# Patient Record
Sex: FEMALE | Race: WHITE | NOT HISPANIC OR LATINO
[De-identification: names, ages, dates, MRNs, and addresses within clinical notes are randomized per-mention and may not be internally consistent; named-entity substitution may affect disease eponyms.]

---

## 2021-04-20 PROBLEM — Z00.00 ENCOUNTER FOR PREVENTIVE HEALTH EXAMINATION: Status: ACTIVE | Noted: 2021-04-20

## 2021-05-17 ENCOUNTER — APPOINTMENT (OUTPATIENT)
Dept: ORTHOPEDIC SURGERY | Facility: CLINIC | Age: 51
End: 2021-05-17
Payer: COMMERCIAL

## 2021-05-17 VITALS — BODY MASS INDEX: 32.18 KG/M2 | HEIGHT: 67 IN | WEIGHT: 205 LBS

## 2021-05-17 DIAGNOSIS — M75.81 OTHER SHOULDER LESIONS, RIGHT SHOULDER: ICD-10-CM

## 2021-05-17 DIAGNOSIS — M25.511 PAIN IN RIGHT SHOULDER: ICD-10-CM

## 2021-05-17 PROCEDURE — 76882 US LMTD JT/FCL EVL NVASC XTR: CPT | Mod: RT,59

## 2021-05-17 PROCEDURE — 99072 ADDL SUPL MATRL&STAF TM PHE: CPT

## 2021-05-17 PROCEDURE — 99204 OFFICE O/P NEW MOD 45 MIN: CPT | Mod: 25

## 2021-05-17 PROCEDURE — 20611 DRAIN/INJ JOINT/BURSA W/US: CPT | Mod: RT

## 2021-05-17 NOTE — DISCUSSION/SUMMARY
[de-identified] : ULTRASOUND EVALUATION  REVEALS INFLAMMATORY CHANGES WITHOUT SIGNIFICANT TEAR \par PATIENT HAS ELECTED TO PROCEED WITH KENALOG INJECTION SHOULDER \par RISKS AND BENEFITS DISCUSSED - VERBAL CONSENT OBTAINED \par \par \par POST INJECTION INSTRUCTIONS\par \par COLD THERAPY , ANALGESICS TYLENOL  PRN\par \par HOME STRETCHING AND EXERCISES QD  HANDOUT PROVIDED, REVIEWED AND DEMONSTRATED  - TBAND 2 \par \par START P.T.  2 WEEKS AFTER INJECTION - 2 X 4 WEEKS - WAIT FOR MRI RESULTS\par \par MRI RIGHT SHOULDER RULE OUT RC TEAR \par

## 2021-05-17 NOTE — PHYSICAL EXAM
[de-identified] : PHYSICAL EXAM  RIGHT  SHOULDER\par \par MILD  SCAPULAR PROTRACTION\par AROM 140 /  140 / 90 / 30 \par TENDER: SA REGION\par \par SPECIAL TESTING :\par KUMAR - POSITIVE \par ALLISON - POSITIVE \par SPEED TEST - POSITIVE\par \par MINOR - NEGATIVE \par APPREHENSION AND SUPPRESSION - NEGATIVE \par \par RC STRENGTH TESTING \par SS:  5/5\par SUB 5/5\par IS     5/5\par BICEPS  5/5\par \par SENSATION  - GROSSLY INTACT\par \par \par  [de-identified] : RIGHT SHOULDER XRAY (2 VIEWS - AP AND OUTLET) -  \par NO OBVIOUS FRACTURE ,  SEPARATION OR DISLOCATION \par NO SIGNIFICANT OSTEOARTHRITIS,\par TYPE 1B ACROMION \par CSA= 38.9\par

## 2021-05-17 NOTE — HISTORY OF PRESENT ILLNESS
[de-identified] : CHRONIC RIGHT SHOULDER PAIN \par PAIN BEGAN AUG 2020 \par PAIN LEVEL: 2/10\par LIMITED ROM \par WORSE WITH EXERCISING, MORNING \par POPPING SOUND \par BETTER WITH ARTHRITIS , IBUPROFEN, HEATING PAD \par \par PT SAW DR. ARIZA IN THE PAST- HAS HAD CORTISONE INJECTION-HELPFUL \par

## 2021-05-17 NOTE — PROCEDURE
[de-identified] : DIAGNOSTIC ULTRASOUND RIGHT SHOULDER\par \par DIAGNOSTIC SONOGRAPHY of the Rotator Cuff Soft Tissue of the RIGHT SHOULDER was performed in Multiple Scan Planes with varying transducer frequencies.\par Imaging of the Supraspinatus Tendon reveals TENDONITIS, BURSITIS, ARTICULAR SIDE DEGENERATION  WITH OUT SIGNIFICANT / COMPLETE TEAR\par Imaging of the Biceps Tendon reveals no significant tear.\par Imaging of the Subscapularis Tendon reveals no significant tear.\par Imaging of the Infraspinatus Tendon reveals no significant tear.\par Key images were save digitally and reviewed with patient.\par \par \par \par INJECTION RIGHT SHOULDER SA SPACE\par \par Patient has demonstrated limited relief from NSAIDS, rest, exercises / PT, and after discussion of the risks and benefits, the patient has elected to proceed with an ULTRASOUND GUIDED injection into the RIGHT SUBACROMIAL  SPACE LATERAL APPROACH \par  \par Confirmed that the patient does not have history of prior adverse reactions, active, infections, or relevant allergies. There was no effusion, erythema, or warmth, and the skin was clear\par \par The skin was sterilized with alcohol. Ethyl Chloride was used as a topical anesthetic. Routine sterile technique. \par The site was injected UTILIZING ULTRASOUND GUIDANCE to confirm appropriate placement of the needle-\par with a mixture of medication and local anesthetic. The injection was completed without complication and a bandage was applied.\par  \par The patient tolerated the procedure well and was given post-injection instructions.Rec: Cold therapy, analgesics, avoid heavy activity.\par MEDICATION: 4cc of 1% xylocaine + 10mg of KENALOG\par \par

## 2021-05-26 ENCOUNTER — APPOINTMENT (OUTPATIENT)
Dept: ORTHOPEDIC SURGERY | Facility: CLINIC | Age: 51
End: 2021-05-26

## 2021-05-27 ENCOUNTER — APPOINTMENT (OUTPATIENT)
Dept: ORTHOPEDIC SURGERY | Facility: CLINIC | Age: 51
End: 2021-05-27
Payer: COMMERCIAL

## 2021-05-27 PROCEDURE — 99213 OFFICE O/P EST LOW 20 MIN: CPT | Mod: 95

## 2021-05-27 NOTE — PHYSICAL EXAM
[de-identified] : PHYSICAL EXAM  RIGHT  SHOULDER - PRIOR \par \par MILD  SCAPULAR PROTRACTION\par AROM 140 /  140 / 90 / 30 \par TENDER: SA REGION\par \par SPECIAL TESTING :\par KUMAR - POSITIVE \par ALLISON - POSITIVE \par SPEED TEST - POSITIVE\par \par MINOR - NEGATIVE \par APPREHENSION AND SUPPRESSION - NEGATIVE \par \par RC STRENGTH TESTING \par SS:  5/5\par SUB 5/5\par IS     5/5\par BICEPS  5/5\par \par SENSATION  - GROSSLY INTACT\par \par \par

## 2021-05-27 NOTE — HISTORY OF PRESENT ILLNESS
[de-identified] : CHRONIC RIGHT SHOULDER PAIN \par FOLLOW UP\par PAIN LEVEL: 1/10\par MRI RESULTS TODAY \par \par PAIN BEGAN AUG 2020 \par LIMITED ROM \par WORSE WITH EXERCISING, MORNING \par POPPING SOUND \par BETTER WITH ARTHRITIS , IBUPROFEN, HEATING PAD \par \par PT SAW DR. ARIZA IN THE PAST- HAS HAD CORTISONE INJECTION-HELPFUL

## 2021-09-02 ENCOUNTER — APPOINTMENT (OUTPATIENT)
Dept: ORTHOPEDIC SURGERY | Facility: CLINIC | Age: 51
End: 2021-09-02
Payer: COMMERCIAL

## 2021-09-02 PROCEDURE — 99213 OFFICE O/P EST LOW 20 MIN: CPT | Mod: 95

## 2021-09-02 RX ORDER — OXYCODONE AND ACETAMINOPHEN 7.5; 325 MG/1; MG/1
7.5-325 TABLET ORAL
Qty: 42 | Refills: 0 | Status: ACTIVE | COMMUNITY
Start: 2021-09-02 | End: 1900-01-01

## 2021-09-02 RX ORDER — ONDANSETRON 8 MG/1
8 TABLET, ORALLY DISINTEGRATING ORAL 3 TIMES DAILY
Qty: 15 | Refills: 0 | Status: ACTIVE | COMMUNITY
Start: 2021-09-02 | End: 1900-01-01

## 2021-09-02 NOTE — HISTORY OF PRESENT ILLNESS
[Home] : at home, [unfilled] , at the time of the visit. [Medical Office: ([facility])___] : at the medical office located in  [Verbal consent obtained from patient] : the patient, [unfilled] [de-identified] : CHRONIC RIGHT SHOULDER PAIN \par FOLLOW UP\par PAIN LEVEL: 1/10\par \par PAIN BEGAN AUG 2020 \par LIMITED ROM \par WORSE WITH EXERCISING, MORNING \par POPPING SOUND \par BETTER WITH ARTHRITIS , IBUPROFEN, HEATING PAD \par MRI = 1CM RC TEAR

## 2021-09-02 NOTE — DISCUSSION/SUMMARY
[de-identified] : PREOP SHOULDER SURGERY DISCUSSION:\par \par PROCEDURE DISCUSSED - QUESTIONS ANSWERED\par PATIENT WISHES TO PROCEED\par \par POST OP CARE AND LIMITATIONS REVIEWED - HANDOUT PROVIDED \par \par COLD PACKS RECOMMENDED\par ANALGESICS PRESCRIBED \par \par \par THERE ARE NO GUARANTEES THAT ALL SYMPTOMS WILL BE ALLEVIATED  \par SHOULDER ARTHROSCOPY, ACROMIOPLASTY, DEBRIDEMENT,  RC REPAIR AND LABRUM REPAIRS- ON AVERAGE 75- 85% SATISFACTORY RESULTS FOR TEARS < 3CM AFTER 9-12 MONTHS HEALING AND REHABILITATION. \par \par REPAIRS WILL REQUIRE STRICT SHOULDER IMMOBILIZER 4-6 WEEKS\par \par RC TEARS 3CM OR LARGER MAY REQUIRE COLLAGEN PATCH AUGMENTATION GENERALLY HAVE LESS SATISFACTORY RESULTS\par \par PHYSICAL THERAPY REQUIRED 2X WEEK FOR  MINIMUM 8-12 WEEKS FOR ALL PROCEDURES \par CONTINUED HOME EXERCISES 6-9 MONTHS AFTER THAT REQUIRED FOR OPTIMAL OUTCOMES \par \par ROUTINE SURGICAL AND ANESTHETIC RISKS INCLUDE RISK OF SURGICAL INFECTION, ANESTHETIC COMPLICATION OR ALLERGY, POSSIBLE RETEARS OR PROGRESSION OF TEAR, STIFFNESS OF SHOULDER AND UNSATISFACTORY OUTCOMES\par \par PATIENT UNDERSTANDS AND WISHES TO PROCEED\par

## 2021-09-02 NOTE — PHYSICAL EXAM
[de-identified] : PHYSICAL EXAM  RIGHT  SHOULDER - PRIOR \par \par MILD  SCAPULAR PROTRACTION\par AROM 140 /  140 / 90 / 30 \par TENDER: SA REGION\par \par SPECIAL TESTING :\par KUMAR - POSITIVE \par ALLISON - POSITIVE \par SPEED TEST - POSITIVE\par \par MINOR - NEGATIVE \par APPREHENSION AND SUPPRESSION - NEGATIVE \par \par RC STRENGTH TESTING \par SS:  5/5\par SUB 5/5\par IS     5/5\par BICEPS  5/5\par \par SENSATION  - GROSSLY INTACT\par \par \par

## 2021-09-04 ENCOUNTER — LABORATORY RESULT (OUTPATIENT)
Age: 51
End: 2021-09-04

## 2021-09-07 ENCOUNTER — APPOINTMENT (OUTPATIENT)
Dept: ORTHOPEDIC SURGERY | Facility: AMBULATORY SURGERY CENTER | Age: 51
End: 2021-09-07
Payer: COMMERCIAL

## 2021-09-07 PROCEDURE — 29827 SHO ARTHRS SRG RT8TR CUF RPR: CPT | Mod: RT

## 2021-09-07 PROCEDURE — 29820 SHO ARTHRS SRG PRTL SYNVCT: CPT | Mod: RT,59

## 2021-09-07 PROCEDURE — 29826 SHO ARTHRS SRG DECOMPRESSION: CPT | Mod: RT,59

## 2021-09-07 PROCEDURE — 29823 SHO ARTHRS SRG XTNSV DBRDMT: CPT | Mod: RT,59

## 2021-09-10 ENCOUNTER — APPOINTMENT (OUTPATIENT)
Dept: ORTHOPEDIC SURGERY | Facility: CLINIC | Age: 51
End: 2021-09-10
Payer: COMMERCIAL

## 2021-09-10 PROCEDURE — 73030 X-RAY EXAM OF SHOULDER: CPT | Mod: RT

## 2021-09-10 PROCEDURE — 99024 POSTOP FOLLOW-UP VISIT: CPT

## 2021-09-10 RX ORDER — CELECOXIB 200 MG/1
200 CAPSULE ORAL TWICE DAILY
Qty: 60 | Refills: 4 | Status: ACTIVE | COMMUNITY
Start: 2021-09-10 | End: 1900-01-01

## 2021-09-10 NOTE — HISTORY OF PRESENT ILLNESS
[de-identified] : CHRONIC RIGHT SHOULDER PAIN \par FOLLOW UP\par PAIN LEVEL: 5/10\par SEPTEMBER 7, 2021 - RIGHT 1CM RC REPAIR , EPIFANIO \par

## 2021-09-10 NOTE — PHYSICAL EXAM
[de-identified] : RIGHT SHOULDER XRAY (2 VIEWS - AP AND OUTLET)  -  \par NO OBVIOUS FRACTURE OR DISLOCATION, \par SATISFACTORY DECOMPRESSION NOTED  , \par ANCHOR SILHOUETTE VISIBLE IN GREATER TUBEROSITY- SATISFACTORY POSITION\par

## 2021-09-16 RX ORDER — IBUPROFEN 800 MG/1
800 TABLET ORAL 3 TIMES DAILY
Qty: 90 | Refills: 5 | Status: ACTIVE | COMMUNITY
Start: 2021-09-16 | End: 1900-01-01

## 2021-09-16 RX ORDER — ZOLPIDEM TARTRATE 10 MG/1
10 TABLET ORAL
Qty: 30 | Refills: 0 | Status: ACTIVE | COMMUNITY
Start: 2021-09-16 | End: 1900-01-01

## 2021-09-29 ENCOUNTER — APPOINTMENT (OUTPATIENT)
Dept: ORTHOPEDIC SURGERY | Facility: CLINIC | Age: 51
End: 2021-09-29
Payer: COMMERCIAL

## 2021-09-29 DIAGNOSIS — M75.41 IMPINGEMENT SYNDROME OF RIGHT SHOULDER: ICD-10-CM

## 2021-09-29 PROCEDURE — 99024 POSTOP FOLLOW-UP VISIT: CPT

## 2021-09-29 NOTE — DISCUSSION/SUMMARY
[de-identified] : SEPT 7, 2021 - RIGHT 1CM RC REPAIR , EPIFANIO \par \par POST OP REPAIR:\par \par COLD THERAPY,ANALGESICS AS NEEDED\par \par SHOULDER IMMOBILIZED FULL TIME X 3 WEEKS - STRICT NO AROM - DESKTOP WORK ALLOWED\par \par SCAPULAR SQUEEZES / ROLLS, ELBOW, WRIST, HAND AROM TID \par \par START PENDULUM EXERCISES, EXT ROT \par \par START AAROM FLEXION, PULLEY\par \par 6 WEEKS POSTOP  - ADVANCE TO P.T.\par

## 2021-09-29 NOTE — PHYSICAL EXAM
[de-identified] : PHYSICAL EXAM SHOULDER POST OP 2\par SEPTEMBER 7, 2021 - RIGHT 1CM RC REPAIR , EPIFANIO \par PORTALS / INCISIONS HEALING - \par NO ERYTHEMA OR CALOR \par \par AROIM 90 / 90 \par \par RC 4/5\par

## 2021-09-29 NOTE — HISTORY OF PRESENT ILLNESS
[de-identified] : CHRONIC RIGHT SHOULDER PAIN \par FOLLOW UP\par PAIN LEVEL: 2-3/10\par SEPTEMBER 7, 2021 - RIGHT 1CM RC REPAIR , EPIFANIO \par PENDULUM EXERCISES-HELPFUL \par

## 2021-11-01 ENCOUNTER — APPOINTMENT (OUTPATIENT)
Dept: ORTHOPEDIC SURGERY | Facility: CLINIC | Age: 51
End: 2021-11-01
Payer: COMMERCIAL

## 2021-11-01 PROCEDURE — 99024 POSTOP FOLLOW-UP VISIT: CPT

## 2021-11-01 NOTE — PHYSICAL EXAM
[de-identified] : PHYSICAL EXAM SHOULDER POST OP 2\par SEPTEMBER 7, 2021 - RIGHT 1CM RC REPAIR , EPIFANIO \par PORTALS / INCISIONS HEALING - \par NO ERYTHEMA OR CALOR \par \par AAROM 140 / 140 / 80 / 0 \par \par RC 4/5\par

## 2021-11-01 NOTE — DISCUSSION/SUMMARY
[de-identified] : SEPT 7, 2021 - RIGHT 1CM RC REPAIR , EPIFANIO \par \par POST OP REPAIR:\par \par COLD THERAPY,ANALGESICS AS NEEDED\par \par CONTINUE PT\par PROGRESS TO STRENGTHENING NOV 16\par

## 2021-11-01 NOTE — HISTORY OF PRESENT ILLNESS
[de-identified] : CHRONIC RIGHT SHOULDER PAIN \par FOLLOW UP - 6 WEEKS \par PAIN LEVEL: 1/10 \par SEPTEMBER 7, 2021 - RIGHT 1 CM RC REPAIR , EPIFANIO \par PT IS GOING TO P.T- 1-2 X WEEK- HELPFUL \par AT HOME EXERCISES-HELPFUL \par

## 2022-01-05 ENCOUNTER — APPOINTMENT (OUTPATIENT)
Dept: ORTHOPEDIC SURGERY | Facility: CLINIC | Age: 52
End: 2022-01-05
Payer: COMMERCIAL

## 2022-01-05 DIAGNOSIS — M75.101 UNSPECIFIED ROTATOR CUFF TEAR OR RUPTURE OF RIGHT SHOULDER, NOT SPECIFIED AS TRAUMATIC: ICD-10-CM

## 2022-01-05 PROCEDURE — 99213 OFFICE O/P EST LOW 20 MIN: CPT

## 2022-01-05 NOTE — PHYSICAL EXAM
[de-identified] : PHYSICAL EXAM SHOULDER POST OP 2\par SEPTEMBER 7, 2021 - RIGHT 1CM RC REPAIR , EPIFANIO \par PORTALS / INCISIONS HEALING - \par NO ERYTHEMA OR CALOR \par \par AAROM 150 / 140 / 90 / 40\par RC 4+ 5\par

## 2022-01-05 NOTE — HISTORY OF PRESENT ILLNESS
[de-identified] : CHRONIC RIGHT SHOULDER PAIN \par FOLLOW UP \par POST OP \par PAIN LEVEL: 1/10 \par SEPTEMBER 7, 2021 - RIGHT 1 CM RC REPAIR , EPIFANIO \par PT WENT  TO P.T- 1-2 X WEEK- HELPFUL \par AT HOME EXERCISES-HELPFUL \par

## 2022-01-05 NOTE — DISCUSSION/SUMMARY
[de-identified] : SEPT 7, 2021 - RIGHT 1CM RC REPAIR , EPIFANIO \par \par POST OP REPAIR:\par \par COLD HEAT  THERAPY,ANALGESICS AS NEEDED\par \par ADD SS ABDUCTOIN WITH TBANDS OR SMALL WEIGHT\par MARCH - RETURN TO ALL ACTIVITIES \par \par \par

## 2022-01-09 ENCOUNTER — TRANSCRIPTION ENCOUNTER (OUTPATIENT)
Age: 52
End: 2022-01-09